# Patient Record
Sex: FEMALE | Race: WHITE | Employment: FULL TIME | URBAN - METROPOLITAN AREA
[De-identification: names, ages, dates, MRNs, and addresses within clinical notes are randomized per-mention and may not be internally consistent; named-entity substitution may affect disease eponyms.]

---

## 2017-01-18 ENCOUNTER — HOSPITAL ENCOUNTER (OUTPATIENT)
Dept: LAB | Age: 37
Discharge: HOME | End: 2017-01-18

## 2017-01-18 LAB — T-SPOT TB TEST (EMPLOYEE),XTBE: NORMAL

## 2017-01-18 PROCEDURE — 36415 COLL VENOUS BLD VENIPUNCTURE: CPT | Performed by: EMERGENCY MEDICINE

## 2019-07-24 ENCOUNTER — OFFICE VISIT (OUTPATIENT)
Dept: FAMILY MEDICINE CLINIC | Age: 39
End: 2019-07-24

## 2019-07-24 VITALS
HEIGHT: 65 IN | HEART RATE: 69 BPM | WEIGHT: 186 LBS | DIASTOLIC BLOOD PRESSURE: 95 MMHG | BODY MASS INDEX: 30.99 KG/M2 | RESPIRATION RATE: 18 BRPM | SYSTOLIC BLOOD PRESSURE: 142 MMHG | OXYGEN SATURATION: 99 % | TEMPERATURE: 98.6 F

## 2019-07-24 DIAGNOSIS — Z13.220 SCREENING, LIPID: ICD-10-CM

## 2019-07-24 DIAGNOSIS — Z00.00 ANNUAL PHYSICAL EXAM: Primary | ICD-10-CM

## 2019-07-24 DIAGNOSIS — E66.09 CLASS 1 OBESITY DUE TO EXCESS CALORIES WITHOUT SERIOUS COMORBIDITY WITH BODY MASS INDEX (BMI) OF 30.0 TO 30.9 IN ADULT: ICD-10-CM

## 2019-07-24 RX ORDER — CHLORPHENIRAMINE MALEATE 4 MG
4 TABLET ORAL
COMMUNITY
End: 2020-04-11

## 2019-07-24 RX ORDER — FAMOTIDINE 20 MG/1
20 TABLET, FILM COATED ORAL 2 TIMES DAILY
COMMUNITY
End: 2020-04-11

## 2019-07-24 NOTE — PROGRESS NOTES
Nicolas Cameron is a 46 y.o. female here today to establish care and for a CPE. Her last pap was last October by [facility]. She goes to [facility] for any other issues.

## 2019-07-24 NOTE — PROGRESS NOTES
Subjective:   46 y.o. female for Well Woman Check. Her gyne and breast care is done elsewhere by her Ob-Gyne physician- [facility]  No signficiant PMH  Has been struggling with weight loss for a few years  No Hx of HTN    There is no problem list on file for this patient. There are no active problems to display for this patient. Current Outpatient Medications   Medication Sig Dispense Refill    chlorpheniramine (CHLOR-TRIMETRON) 4 mg tablet Take 4 mg by mouth every six (6) hours as needed for Allergies.  famotidine (PEPCID) 20 mg tablet Take 20 mg by mouth two (2) times a day. No Known Allergies  No past medical history on file. No past surgical history on file. Family History   Problem Relation Age of Onset    Hypertension Mother     Elevated Lipids Mother     Cancer Father         liver and ampillary    Cancer Maternal Grandmother         colon and lymphoma    Heart Disease Maternal Grandfather     Diabetes Paternal Grandmother     Alzheimer Paternal Grandfather      Social History     Tobacco Use    Smoking status: Never Smoker    Smokeless tobacco: Never Used   Substance Use Topics    Alcohol use: Yes      ROS: Feeling generally well. No TIA's or unusual headaches, no dysphagia. No prolonged cough. No dyspnea or chest pain on exertion. No abdominal pain, change in bowel habits, black or bloody stools. No urinary tract symptoms. No new or unusual musculoskeletal symptoms. Objective: The patient appears well, alert, oriented x 3, in no distress. Visit Vitals  BP (!) 142/95 (BP 1 Location: Right arm, BP Patient Position: Sitting)   Pulse 69   Temp 98.6 °F (37 °C) (Oral)   Resp 18   Ht 5' 5\" (1.651 m)   Wt 186 lb (84.4 kg)   LMP 07/09/2019 (Exact Date)   SpO2 99%   BMI 30.95 kg/m²     ENT normal.  Neck supple. No adenopathy or thyromegaly. DILLAN. Lungs are clear, good air entry, no wheezes, rhonchi or rales. S1 and S2 normal, no murmurs, regular rate and rhythm. Abdomen soft without tenderness, guarding, mass or organomegaly. Extremities show no edema, normal peripheral pulses. Neurological is normal, no focal findings. Mood and affect are normal  Breast and Pelvic exams are deferred. Assessment/Plan:       ICD-10-CM ICD-9-CM    1. Annual physical exam Z00.00 V70.0 CBC WITH AUTOMATED DIFF      METABOLIC PANEL, COMPREHENSIVE      LIPID PANEL      TSH 3RD GENERATION      VITAMIN D, 25 HYDROXY   2. Screening, lipid Z13.220 V77.91 CBC WITH AUTOMATED DIFF      METABOLIC PANEL, COMPREHENSIVE      LIPID PANEL      TSH 3RD GENERATION      VITAMIN D, 25 HYDROXY   3. Class 1 obesity due to excess calories without serious comorbidity with body mass index (BMI) of 30.0 to 30.9 in adult E66.09 278.00 CBC WITH AUTOMATED DIFF    J51.44 U51.08 METABOLIC PANEL, COMPREHENSIVE      LIPID PANEL      TSH 3RD GENERATION      VITAMIN D, 25 HYDROXY     She will get fasting lipids done at work. Lab orders placed in chart. She will work on weight loss, cut back on caloric intake and portion sizes. She has already started to make diet changes. Follow-up and Dispositions    · Return in about 3 months (around 10/24/2019), or lipids, obesity. Charli Burgos PA-C  07/24/19  Discussed the patient's BMI with her. The BMI follow up plan is as follows:     dietary management education, guidance, and counseling  encourage exercise  monitor weight    An After Visit Summary was printed and given to the patient.

## 2019-07-24 NOTE — PATIENT INSTRUCTIONS
Well Visit, Ages 25 to 48: Care Instructions  Your Care Instructions    Physical exams can help you stay healthy. Your doctor has checked your overall health and may have suggested ways to take good care of yourself. He or she also may have recommended tests. At home, you can help prevent illness with healthy eating, regular exercise, and other steps. Follow-up care is a key part of your treatment and safety. Be sure to make and go to all appointments, and call your doctor if you are having problems. It's also a good idea to know your test results and keep a list of the medicines you take. How can you care for yourself at home? · Reach and stay at a healthy weight. This will lower your risk for many problems, such as obesity, diabetes, heart disease, and high blood pressure. · Get at least 30 minutes of physical activity on most days of the week. Walking is a good choice. You also may want to do other activities, such as running, swimming, cycling, or playing tennis or team sports. Discuss any changes in your exercise program with your doctor. · Do not smoke or allow others to smoke around you. If you need help quitting, talk to your doctor about stop-smoking programs and medicines. These can increase your chances of quitting for good. · Talk to your doctor about whether you have any risk factors for sexually transmitted infections (STIs). Having one sex partner (who does not have STIs and does not have sex with anyone else) is a good way to avoid these infections. · Use birth control if you do not want to have children at this time. Talk with your doctor about the choices available and what might be best for you. · Protect your skin from too much sun. When you're outdoors from 10 a.m. to 4 p.m., stay in the shade or cover up with clothing and a hat with a wide brim. Wear sunglasses that block UV rays. Even when it's cloudy, put broad-spectrum sunscreen (SPF 30 or higher) on any exposed skin.   · See a dentist one or two times a year for checkups and to have your teeth cleaned. · Wear a seat belt in the car. Follow your doctor's advice about when to have certain tests. These tests can spot problems early. For everyone  · Cholesterol. Have the fat (cholesterol) in your blood tested after age 21. Your doctor will tell you how often to have this done based on your age, family history, or other things that can increase your risk for heart disease. · Blood pressure. Have your blood pressure checked during a routine doctor visit. Your doctor will tell you how often to check your blood pressure based on your age, your blood pressure results, and other factors. · Vision. Talk with your doctor about how often to have a glaucoma test.  · Diabetes. Ask your doctor whether you should have tests for diabetes. · Colon cancer. Your risk for colorectal cancer gets higher as you get older. Some experts say that adults should start regular screening at age 48 and stop at age 76. Others say to start before age 48 or continue after age 76. Talk with your doctor about your risk and when to start and stop screening. For women  · Breast exam and mammogram. Talk to your doctor about when you should have a clinical breast exam and a mammogram. Medical experts differ on whether and how often women under 50 should have these tests. Your doctor can help you decide what is right for you. · Cervical cancer screening test and pelvic exam. Begin with a Pap test at age 24. The test often is part of a pelvic exam. Starting at age 27, you may choose to have a Pap test, an HPV test, or both tests at the same time (called co-testing). Talk with your doctor about how often to have testing. · Tests for sexually transmitted infections (STIs). Ask whether you should have tests for STIs. You may be at risk if you have sex with more than one person, especially if your partners do not wear condoms.   For men  · Tests for sexually transmitted infections (STIs). Ask whether you should have tests for STIs. You may be at risk if you have sex with more than one person, especially if you do not wear a condom. · Testicular cancer exam. Ask your doctor whether you should check your testicles regularly. · Prostate exam. Talk to your doctor about whether you should have a blood test (called a PSA test) for prostate cancer. Experts differ on whether and when men should have this test. Some experts suggest it if you are older than 39 and are -American or have a father or brother who got prostate cancer when he was younger than 72. When should you call for help? Watch closely for changes in your health, and be sure to contact your doctor if you have any problems or symptoms that concern you. Where can you learn more? Go to http://kunal-shalonda.info/. Enter P072 in the search box to learn more about \"Well Visit, Ages 25 to 48: Care Instructions. \"  Current as of: December 13, 2018  Content Version: 12.1  © 0769-8741 [employer]. Care instructions adapted under license by [employer] (which disclaims liability or warranty for this information). If you have questions about a medical condition or this instruction, always ask your healthcare professional. [address] any warranty or liability for your use of this information. Body Mass Index: Care Instructions  Your Care Instructions    Body mass index (BMI) can help you see if your weight is raising your risk for health problems. It uses a formula to compare how much you weigh with how tall you are. · A BMI lower than 18.5 is considered underweight. · A BMI between 18.5 and 24.9 is considered healthy. · A BMI between 25 and 29.9 is considered overweight. A BMI of 30 or higher is considered obese. If your BMI is in the normal range, it means that you have a lower risk for weight-related health problems.  If your BMI is in the overweight or obese range, you may be at increased risk for weight-related health problems, such as high blood pressure, heart disease, stroke, arthritis or joint pain, and diabetes. If your BMI is in the underweight range, you may be at increased risk for health problems such as fatigue, lower protection (immunity) against illness, muscle loss, bone loss, hair loss, and hormone problems. BMI is just one measure of your risk for weight-related health problems. You may be at higher risk for health problems if you are not active, you eat an unhealthy diet, or you drink too much alcohol or use tobacco products. Follow-up care is a key part of your treatment and safety. Be sure to make and go to all appointments, and call your doctor if you are having problems. It's also a good idea to know your test results and keep a list of the medicines you take. How can you care for yourself at home? · Practice healthy eating habits. This includes eating plenty of fruits, vegetables, whole grains, lean protein, and low-fat dairy. · If your doctor recommends it, get more exercise. Walking is a good choice. Bit by bit, increase the amount you walk every day. Try for at least 30 minutes on most days of the week. · Do not smoke. Smoking can increase your risk for health problems. If you need help quitting, talk to your doctor about stop-smoking programs and medicines. These can increase your chances of quitting for good. · Limit alcohol to 2 drinks a day for men and 1 drink a day for women. Too much alcohol can cause health problems. If you have a BMI higher than 25  · Your doctor may do other tests to check your risk for weight-related health problems. This may include measuring the distance around your waist. A waist measurement of more than 40 inches in men or 35 inches in women can increase the risk of weight-related health problems. · Talk with your doctor about steps you can take to stay healthy or improve your health. You may need to make lifestyle changes to lose weight and stay healthy, such as changing your diet and getting regular exercise. If you have a BMI lower than 18.5  · Your doctor may do other tests to check your risk for health problems. · Talk with your doctor about steps you can take to stay healthy or improve your health. You may need to make lifestyle changes to gain or maintain weight and stay healthy, such as getting more healthy foods in your diet and doing exercises to build muscle. Where can you learn more? Go to http://kunal-shalonda.info/. Enter S176 in the search box to learn more about \"Body Mass Index: Care Instructions. \"  Current as of: October 13, 2016  Content Version: 11.4  © 0246-7128 [employer], [employer]. Care instructions adapted under license by [employer] (which disclaims liability or warranty for this information). If you have questions about a medical condition or this instruction, always ask your healthcare professional. [address] any warranty or liability for your use of this information.

## 2020-04-09 PROBLEM — Z34.90 PREGNANCY: Status: ACTIVE | Noted: 2020-04-09

## 2020-04-09 PROBLEM — O32.8XX0 BREECH PRESENTATION, DOUBLE FOOTLING: Status: ACTIVE | Noted: 2020-04-09

## 2020-08-05 NOTE — PATIENT DISCUSSION
The patient feels that the cataract is significantly impacting daily activities and has elected cataract surgery. The risks, benefits, and alternatives to surgery were discussed. The patient elects to proceed with surgery. patient elects pciol os basic goal jose miguel.

## 2020-11-05 NOTE — PATIENT DISCUSSION
Patient advised of the right to post-operative care by the surgeon. Patient is fully informed of, and agreed to, co-management with their primary optometric physician. Post-operative care by the surgeon is not medically necessary and co-management is clinically appropriate. Patient has received itemization of fees related to cataract surgery. Transfer of care letter completed for the patient. Transfer care of left eye to Dr. Baltazar Maya on 11/5/20. Patient instructed to call immediately if any new distortion, blurring, decreased vision or eye pain.

## 2021-06-09 NOTE — PROCEDURE NOTE: CLINICAL
PROCEDURE NOTE: Probing of Lacrimal Canaliculi, With or Without Irrigation OS > OD. Diagnosis: Epiphora. Prep: Betadine Flush. Risks, benefits and alternatives discussed. The patient desires to proceed with probe and irrigation of the involved puncta today and the puncta/lacrimal system was found to be patent/blocked. See chart plan notes for further discussion. Patient tolerated the procedure well and left in good condition. Sina Atkins

## 2021-06-09 NOTE — PATIENT DISCUSSION
Probing and Irrigation performed today OU. No blockage noted.  Flushed through to throat OU, small punctum noted OD.  Discussed 3-snip OD to open punctum, if tearing becomes bothersome. Discussed no need for 3 snip from our stand point; recommend she f/u with ENT for her sinus issues.

## 2021-06-09 NOTE — PATIENT DISCUSSION
Discussed may need Both lower lid Ectropion repair with canthoplasty in the future.  Discussed one eye at a time , starting with OS.  Will monitor at this time.

## 2021-09-16 ENCOUNTER — IMPORTED ENCOUNTER (OUTPATIENT)
Dept: URBAN - METROPOLITAN AREA CLINIC 1 | Facility: CLINIC | Age: 41
End: 2021-09-16

## 2021-09-16 PROBLEM — H52.13: Noted: 2021-09-16

## 2021-09-16 NOTE — PATIENT DISCUSSION
Myopia: Pt acceptable candidate for PRK due to thinner corneas. Discussed risks/benefits and need for reading rx due presbyopia. Advised pt to to return next saturday for  repeat measurements out of contact lenses. (Mrx and K hue). Given measurements stable pt can return to contacts lens wear up to 2 days prior to [facility] procedure.

## 2021-09-18 ENCOUNTER — IMPORTED ENCOUNTER (OUTPATIENT)
Dept: URBAN - METROPOLITAN AREA CLINIC 1 | Facility: CLINIC | Age: 41
End: 2021-09-18

## 2021-10-01 ENCOUNTER — IMPORTED ENCOUNTER (OUTPATIENT)
Dept: URBAN - METROPOLITAN AREA CLINIC 1 | Facility: CLINIC | Age: 41
End: 2021-10-01

## 2021-10-04 ENCOUNTER — IMPORTED ENCOUNTER (OUTPATIENT)
Dept: URBAN - METROPOLITAN AREA CLINIC 1 | Facility: CLINIC | Age: 41
End: 2021-10-04

## 2021-10-04 PROBLEM — Z98.89: Noted: 2021-10-04

## 2021-10-06 ENCOUNTER — IMPORTED ENCOUNTER (OUTPATIENT)
Dept: URBAN - METROPOLITAN AREA CLINIC 1 | Facility: CLINIC | Age: 41
End: 2021-10-06

## 2021-10-08 ENCOUNTER — IMPORTED ENCOUNTER (OUTPATIENT)
Dept: URBAN - METROPOLITAN AREA CLINIC 1 | Facility: CLINIC | Age: 41
End: 2021-10-08

## 2021-10-08 PROBLEM — Z98.89: Noted: 2021-10-08

## 2021-10-08 NOTE — PATIENT DISCUSSION
1. POW#1 PRK OS/ POD day 2 OD - doing well Cont Durezol BID OUCont Ocuflox BID ODCont ATs QID OU Routinely. 2.  Return for an appointment for Tuesday for Post OP with Dr. Daylin Mcallister.
Return for an appointment for Tuesday for Post OP with Dr. Dimple Zhang.
WDL

## 2021-10-12 ENCOUNTER — IMPORTED ENCOUNTER (OUTPATIENT)
Dept: URBAN - METROPOLITAN AREA CLINIC 1 | Facility: CLINIC | Age: 41
End: 2021-10-12

## 2021-10-12 PROCEDURE — 99024 POSTOP FOLLOW-UP VISIT: CPT

## 2021-10-12 NOTE — PATIENT DISCUSSION
1. POW #2 [address] OS/ POW #1 OD -- doing well. BCL removed OD today. Cont Durezol BID OU (2x Lotemax SM samples given)Cont Ocuflox BID OD x 2 days then D/c. Cont ATs QID OU routinely. Return for an appointment in 3 weeks po/PRK with Dr. Elisha Woods.

## 2021-11-02 ENCOUNTER — IMPORTED ENCOUNTER (OUTPATIENT)
Dept: URBAN - METROPOLITAN AREA CLINIC 1 | Facility: CLINIC | Age: 41
End: 2021-11-02

## 2021-11-02 PROBLEM — Z98.89: Noted: 2021-11-02

## 2021-11-02 PROCEDURE — 99024 POSTOP FOLLOW-UP VISIT: CPT

## 2021-11-02 NOTE — PATIENT DISCUSSION
POM #1 Asha [address] OU - Doing Well Continue Durezol QD until goneTears QID RoutinelyReturn for an appointment in 2 months po/PRK with Dr. Sofie Lazo.

## 2021-11-30 ENCOUNTER — IMPORTED ENCOUNTER (OUTPATIENT)
Dept: URBAN - METROPOLITAN AREA CLINIC 1 | Facility: CLINIC | Age: 41
End: 2021-11-30

## 2021-11-30 PROCEDURE — 99024 POSTOP FOLLOW-UP VISIT: CPT

## 2021-11-30 NOTE — PATIENT DISCUSSION
POM#1 Reassurance discussed. Urged compliance with the use of OTC ATs QID or more OU. Return for an appointment in January for a PO/PRK with Dr. Michelle Johnson.

## 2022-03-18 PROBLEM — Z34.90 PREGNANCY: Status: ACTIVE | Noted: 2020-04-09

## 2022-03-19 PROBLEM — O32.8XX0 BREECH PRESENTATION, DOUBLE FOOTLING: Status: ACTIVE | Noted: 2020-04-09

## 2022-04-02 ASSESSMENT — VISUAL ACUITY
OD_SC: 20/30
OS_CC: 20/400
OS_SC: J1+
OS_CC: 20/15
OS_CC: 20/15
OD_CC: 20/30
OD_CC: 20/25
OS_CC: 20/30
OD_CC: CF@6'
OD_SC: J1+
OS_CC: 20/20
OS_SC: 20/40
OD_CC: 20/20

## 2022-04-02 ASSESSMENT — TONOMETRY
OS_IOP_MMHG: 13
OD_IOP_MMHG: 14
OD_IOP_MMHG: 13
OD_IOP_MMHG: 14
OS_IOP_MMHG: 14
OS_IOP_MMHG: 14

## 2022-07-11 ENCOUNTER — HOSPITAL ENCOUNTER (OUTPATIENT)
Dept: LAB | Age: 42
Discharge: HOME | End: 2022-07-11

## 2022-07-11 LAB — XX-LABCORP SPECIMEN COL,LCBCF: NORMAL

## 2022-07-11 PROCEDURE — 99001 SPECIMEN HANDLING PT-LAB: CPT

## 2022-08-17 ENCOUNTER — NURSE TRIAGE (OUTPATIENT)
Dept: OTHER | Facility: CLINIC | Age: 42
End: 2022-08-17

## 2022-08-17 NOTE — TELEPHONE ENCOUNTER
Location of employment: [address] injury occurred: cross walk at the hospital    Location of injury (body part involved): left hand  and wrist, right hamstring    Time of injury: 655am    Last 4 of SSN: [id number]    Subjective: Caller states \"left hand and wrist hurt a little because she slammed her hand down on a car as it was about to hit her. Feels like right hamstring is a little strained\"     Patient states she does not need to be seen, just was told to call to get the packet emailed to her. Current Symptoms: see above    Pain Severity: did not rate    Temperature: n/a     What has been tried: n/a    LMP: NA Pregnant: NA    Recommended disposition: Jonel Escobar 4335 advice provided, patient verbalizes understanding; denies any other questions or concerns; instructed to call back for any new or worsening symptoms.     See above    Reason for Disposition   Minor hand or wrist injury    Protocols used: Hand and Wrist Injury-ADULT-[place]

## 2024-09-04 ENCOUNTER — HOSPITAL ENCOUNTER (OUTPATIENT)
Facility: HOSPITAL | Age: 44
Discharge: HOME | End: 2024-09-07
Payer: COMMERCIAL

## 2024-09-04 ENCOUNTER — HOSPITAL ENCOUNTER (OUTPATIENT)
Dept: WOMENS IMAGING | Facility: HOSPITAL | Age: 44
Discharge: HOME | End: 2024-09-07
Payer: COMMERCIAL

## 2024-09-04 VITALS — HEIGHT: 65 IN | BODY MASS INDEX: 29.16 KG/M2 | WEIGHT: 175 LBS

## 2024-09-04 DIAGNOSIS — N63.20 MASS OF LEFT BREAST, UNSPECIFIED QUADRANT: ICD-10-CM

## 2024-09-04 PROCEDURE — 76642 ULTRASOUND BREAST LIMITED: CPT

## 2024-09-04 PROCEDURE — G0279 TOMOSYNTHESIS, MAMMO: HCPCS

## 2025-01-29 NOTE — PATIENT DISCUSSION
Artificial Tears: One drop to both eyes 3-4 times daily. We recommend Systane or Refresh lubricating eye drops which can be found at any pharmacy.
Co-managed patient - cleared for return to [address] [place] [address].
Continue with post-operative drops until completed.
Goal Basic jose miguel.
Good postoperative appearance.
H&P essentially unchanged.
Monitor.
Patient advised of the right to post-operative care by the surgeon. Patient is fully informed of, and agreed to, co-management with their primary optometric physician. Post-operative care by the surgeon is not medically necessary and co-management is clinically appropriate. Patient has received itemization of fees related to cataract surgery. Transfer of care letter completed for the patient. Transfer care of right eye to Dr. Davin Silva on 11/12/20. Patient instructed to call immediately if any new distortion, blurring, decreased vision or eye pain.
Patient is cleared for anesthesia.
Patient made aware of 24/7 emergency services.
Recommended artificial tears to use: 1 drop 4x a day in both eyes.
The patient feels that the cataract is significantly impacting daily activities and has elected cataract surgery. The risks, benefits, and alternatives to surgery were discussed. The patient elects to proceed with surgery.
The types of intraocular lenses were reviewed with the patient along with a discussion of their various strengths and weaknesses.
basic jose miguel ou imprimis rae po comng topical no van.
Patient plan for debridement of left AKA tomorrow